# Patient Record
(demographics unavailable — no encounter records)

---

## 2024-10-18 NOTE — REVIEW OF SYSTEMS
[Wake up at night to urinate  How many times?  ___] : wakes up to urinate [unfilled] times during the night [Leakage of urine with urgency] : leakage of urine with urgency [Difficulty Walking] : difficulty walking [Negative] : Heme/Lymph [FreeTextEntry2] : high blood pressure  [FreeTextEntry6] : frequent urination

## 2024-10-25 NOTE — ASSESSMENT
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

## 2024-10-25 NOTE — PHYSICAL EXAM
[FreeTextEntry3] : Right preauricular :  Scaling waxy stuck on plaque  Left medial cheek:  scaling erythematous papules;   + lentigines and solar damage are present in sun exposed areas;